# Patient Record
Sex: MALE
[De-identification: names, ages, dates, MRNs, and addresses within clinical notes are randomized per-mention and may not be internally consistent; named-entity substitution may affect disease eponyms.]

---

## 2019-03-15 ENCOUNTER — APPOINTMENT (OUTPATIENT)
Dept: OTOLARYNGOLOGY | Facility: CLINIC | Age: 64
End: 2019-03-15
Payer: MEDICARE

## 2019-03-15 VITALS
BODY MASS INDEX: 27.83 KG/M2 | HEIGHT: 73 IN | HEART RATE: 56 BPM | WEIGHT: 210 LBS | DIASTOLIC BLOOD PRESSURE: 70 MMHG | SYSTOLIC BLOOD PRESSURE: 124 MMHG

## 2019-03-15 DIAGNOSIS — Z80.52 FAMILY HISTORY OF MALIGNANT NEOPLASM OF BLADDER: ICD-10-CM

## 2019-03-15 DIAGNOSIS — Z87.09 PERSONAL HISTORY OF OTHER DISEASES OF THE RESPIRATORY SYSTEM: ICD-10-CM

## 2019-03-15 DIAGNOSIS — Z86.69 PERSONAL HISTORY OF OTHER DISEASES OF THE NERVOUS SYSTEM AND SENSE ORGANS: ICD-10-CM

## 2019-03-15 DIAGNOSIS — J30.0 VASOMOTOR RHINITIS: ICD-10-CM

## 2019-03-15 DIAGNOSIS — Z78.9 OTHER SPECIFIED HEALTH STATUS: ICD-10-CM

## 2019-03-15 DIAGNOSIS — H93.293 OTHER ABNORMAL AUDITORY PERCEPTIONS, BILATERAL: ICD-10-CM

## 2019-03-15 DIAGNOSIS — B20 HUMAN IMMUNODEFICIENCY VIRUS [HIV] DISEASE: ICD-10-CM

## 2019-03-15 DIAGNOSIS — Z82.3 FAMILY HISTORY OF STROKE: ICD-10-CM

## 2019-03-15 DIAGNOSIS — Z83.3 FAMILY HISTORY OF DIABETES MELLITUS: ICD-10-CM

## 2019-03-15 DIAGNOSIS — Z80.7 FAMILY HISTORY OF OTHER MALIGNANT NEOPLASMS OF LYMPHOID, HEMATOPOIETIC AND RELATED TISSUES: ICD-10-CM

## 2019-03-15 PROCEDURE — 99213 OFFICE O/P EST LOW 20 MIN: CPT

## 2019-03-15 RX ORDER — ZOLPIDEM TARTRATE 10 MG/1
10 TABLET ORAL
Qty: 30 | Refills: 0 | Status: ACTIVE | COMMUNITY
Start: 2019-01-18

## 2019-03-15 RX ORDER — ELVITEGRAVIR, COBICISTAT, EMTRICITABINE, AND TENOFOVIR ALAFENAMIDE 150; 150; 200; 10 MG/1; MG/1; MG/1; MG/1
150-150-200-10 TABLET ORAL
Qty: 30 | Refills: 0 | Status: ACTIVE | COMMUNITY
Start: 2019-01-18

## 2019-03-15 NOTE — ASSESSMENT
[FreeTextEntry1] : He has abnormal auditory perception. He thinks he may have worsening of his hearing. His ears were normal on exam\par \par PLAN\par \par -findings and management options discussed in detail with the patient. \par -good aural hygiene\par -avoid using cotton swabs in the ears\par -noise precautions\par -audiogram recommended. he is unable to stay for the test today. He will return for it. \par -allergy and reflux medications prn\par -follow up for the audiogram\par -call and return earlier if any concerns. \par \par

## 2019-04-02 ENCOUNTER — APPOINTMENT (OUTPATIENT)
Dept: OTOLARYNGOLOGY | Facility: CLINIC | Age: 64
End: 2019-04-02
Payer: MEDICARE

## 2019-04-02 PROCEDURE — 92567 TYMPANOMETRY: CPT

## 2019-04-02 PROCEDURE — 99213 OFFICE O/P EST LOW 20 MIN: CPT | Mod: 25

## 2019-04-02 PROCEDURE — G0268 REMOVAL OF IMPACTED WAX MD: CPT

## 2019-04-02 PROCEDURE — 92557 COMPREHENSIVE HEARING TEST: CPT

## 2019-04-02 RX ORDER — ILOPERIDONE 2 MG/1
2 TABLET ORAL
Qty: 30 | Refills: 0 | Status: COMPLETED | COMMUNITY
Start: 2018-10-19

## 2019-04-02 RX ORDER — AMOXICILLIN 500 MG/1
500 CAPSULE ORAL
Qty: 21 | Refills: 0 | Status: COMPLETED | COMMUNITY
Start: 2018-10-16

## 2019-04-02 RX ORDER — CHLORHEXIDINE GLUCONATE, 0.12% ORAL RINSE 1.2 MG/ML
0.12 SOLUTION DENTAL
Qty: 473 | Refills: 0 | Status: COMPLETED | COMMUNITY
Start: 2018-10-16

## 2019-04-02 RX ORDER — TRAZODONE HYDROCHLORIDE 50 MG/1
50 TABLET ORAL
Qty: 90 | Refills: 0 | Status: ACTIVE | COMMUNITY
Start: 2018-10-19

## 2019-04-02 RX ORDER — LATANOPROST/PF 0.005 %
0.01 DROPS OPHTHALMIC (EYE)
Qty: 25 | Refills: 0 | Status: ACTIVE | COMMUNITY
Start: 2018-09-18

## 2019-04-02 NOTE — ASSESSMENT
[FreeTextEntry1] : He has a mild to  moderate bilateral high-frequency sensorineural hearing loss. He had cerumen impaction in the right ear which was removed\par \par PLAN\par \par -findings and management options discussed in detail with the patient. \par -good aural hygiene\par -avoid using cotton swabs in the ears\par -noise precautions\par -annual audiogram\par -follow up 6 months\par -call and return earlier if any concerns. \par

## 2019-04-02 NOTE — HISTORY OF PRESENT ILLNESS
[de-identified] : Chance Sauceda is here for followup for hearing loss. He had the audiogram. We reviewed the results. He does have mild to moderate bilateral high-frequency sensorineural hearing loss. He is otherwise doing well\par \par ENT History\par 3/15/19- He is here today for possible hearing loss. He feels that he has been talking more loudly over the past few months. He is also missing words. He denies tinnitus, dizziness, otalgia, or otorrhea. He has history of chronic rhinitis and reflux. He does not have nasal or throat symptoms today

## 2019-06-28 ENCOUNTER — APPOINTMENT (OUTPATIENT)
Dept: OTOLARYNGOLOGY | Facility: CLINIC | Age: 64
End: 2019-06-28
Payer: MEDICARE

## 2019-06-28 VITALS — SYSTOLIC BLOOD PRESSURE: 122 MMHG | HEART RATE: 58 BPM | DIASTOLIC BLOOD PRESSURE: 78 MMHG

## 2019-06-28 DIAGNOSIS — H61.21 IMPACTED CERUMEN, RIGHT EAR: ICD-10-CM

## 2019-06-28 PROCEDURE — 69210 REMOVE IMPACTED EAR WAX UNI: CPT

## 2019-06-28 PROCEDURE — 99213 OFFICE O/P EST LOW 20 MIN: CPT | Mod: 25

## 2019-06-28 NOTE — HISTORY OF PRESENT ILLNESS
[de-identified] : 4/2/19- Chance Sauceda is here for followup for hearing loss. He had the audiogram. We reviewed the results. He does have mild to moderate bilateral high-frequency sensorineural hearing loss. He is otherwise doing well\par \par ENT History\par 3/15/19- He is here today for possible hearing loss. He feels that he has been talking more loudly over the past few months. He is also missing words. He denies tinnitus, dizziness, otalgia, or otorrhea. He has history of chronic rhinitis and reflux. He does not have nasal or throat symptoms today  [FreeTextEntry1] : \par 6/28/19- DAT HAIRSTON Is here to check his throat. He had a sore throat the other day that was mild. It has resolved. He has no other symptoms. He has a history of reflux. He saw gastroenterologist. He is not currently on medication. He has a history of recurrent cerumen impaction.

## 2019-06-28 NOTE — ASSESSMENT
[FreeTextEntry1] : He had a mild sore throat the other day which has resolved.  He had cerumen impaction which was removed\par \par PLAN\par \par -findings and management options discussed in detail with the patient. \par -good aural hygiene\par -wax removal drops prn\par -annual audiogram\par -hydration\par -reflux precautions and medication as needed. He was told to speak with his GI about how long he should be treated\par -follow up if he has recurrent throat symptoms.we will proceed with further work up. Otherwise, I will see him back in a few months to check his ears and his throat\par -call and return earlier if any concerns. \par

## 2019-07-11 ENCOUNTER — APPOINTMENT (OUTPATIENT)
Dept: OTOLARYNGOLOGY | Facility: CLINIC | Age: 64
End: 2019-07-11
Payer: MEDICARE

## 2019-07-11 DIAGNOSIS — Z87.09 PERSONAL HISTORY OF OTHER DISEASES OF THE RESPIRATORY SYSTEM: ICD-10-CM

## 2019-07-11 PROCEDURE — 99213 OFFICE O/P EST LOW 20 MIN: CPT | Mod: 25

## 2019-07-11 PROCEDURE — 31575 DIAGNOSTIC LARYNGOSCOPY: CPT

## 2019-07-11 RX ORDER — ZALEPLON 10 MG/1
10 CAPSULE ORAL
Qty: 30 | Refills: 0 | Status: ACTIVE | COMMUNITY
Start: 2019-05-23

## 2019-07-11 RX ORDER — AMOXICILLIN 500 MG/1
500 TABLET, FILM COATED ORAL
Qty: 56 | Refills: 0 | Status: COMPLETED | COMMUNITY
Start: 2019-06-21

## 2019-07-11 RX ORDER — TOPIRAMATE 25 MG/1
25 TABLET, FILM COATED ORAL
Qty: 60 | Refills: 0 | Status: ACTIVE | COMMUNITY
Start: 2019-05-21

## 2019-07-11 RX ORDER — HYDROCORTISONE 25 MG/G
2.5 CREAM TOPICAL
Qty: 2835 | Refills: 0 | Status: ACTIVE | COMMUNITY
Start: 2019-03-06

## 2019-07-11 NOTE — ASSESSMENT
[FreeTextEntry1] : He has an acute upper respiratory tract infection. This is likely viral in origin. A culture was sent to rule out bacterial infection\par \par PLAN\par \par -findings and management options discussed in detail with the patient. \par -supportive management for the URI\par -saltwater gargles as needed\par -OTC decongestants and flonase as needed\par -He was asked to call for the culture results on Monday\par -reflux precautions and medication for reflux as needed. \par -follow up if symptoms do not resolve over the next couple of weeks.  
Gastric reflux

## 2019-07-11 NOTE — HISTORY OF PRESENT ILLNESS
[de-identified] : 4/2/19- Chance Sauceda is here for followup for hearing loss. He had the audiogram. We reviewed the results. He does have mild to moderate bilateral high-frequency sensorineural hearing loss. He is otherwise doing well\par \par ENT History\par 3/15/19- He is here today for possible hearing loss. He feels that he has been talking more loudly over the past few months. He is also missing words. He denies tinnitus, dizziness, otalgia, or otorrhea. He has history of chronic rhinitis and reflux. He does not have nasal or throat symptoms today  [FreeTextEntry1] : \par 6/28/19- DAT SILVA Is here to check his throat. He had a sore throat the other day that was mild. It has resolved. He has no other symptoms. He has a history of reflux. He saw gastroenterologist. He is not currently on medication. He has a history of recurrent cerumen impaction.\par \par 7/11/19- Dat Silva is here with a 2-3 day history of sore throat, nasal congestion and cough. He is concerned he may have an infection.

## 2019-07-15 LAB — BACTERIA THROAT CULT: NORMAL

## 2019-09-20 ENCOUNTER — APPOINTMENT (OUTPATIENT)
Dept: OTOLARYNGOLOGY | Facility: CLINIC | Age: 64
End: 2019-09-20
Payer: MEDICARE

## 2019-09-20 DIAGNOSIS — H69.81 OTHER SPECIFIED DISORDERS OF EUSTACHIAN TUBE, RIGHT EAR: ICD-10-CM

## 2019-09-20 PROCEDURE — 92567 TYMPANOMETRY: CPT

## 2019-09-20 PROCEDURE — 92557 COMPREHENSIVE HEARING TEST: CPT

## 2019-09-20 PROCEDURE — 99213 OFFICE O/P EST LOW 20 MIN: CPT | Mod: 25

## 2019-09-20 PROCEDURE — G0268 REMOVAL OF IMPACTED WAX MD: CPT

## 2019-09-20 NOTE — ASSESSMENT
[FreeTextEntry1] : He has right abnormal auditory perception and sensation of eustachian tube dysfunction. He had cerumen bilaterally which was removed.  There was no middle ear effusion or infection. Audiogram showed no change in his hearing.\par \par PLAN\par \par -findings and management options discussed in detail with the patient. \par -good aural hygiene\par -avoid using cotton swabs in the ears\par -annual audiogram\par -trial of flonase and/or a decongestant\par -follow up if symptoms do not resolve over the next couple of weeks.

## 2019-09-20 NOTE — HISTORY OF PRESENT ILLNESS
[FreeTextEntry1] : \par 6/28/19- DAT SILVA Is here to check his throat. He had a sore throat the other day that was mild. It has resolved. He has no other symptoms. He has a history of reflux. He saw gastroenterologist. He is not currently on medication. He has a history of recurrent cerumen impaction.\par \par 7/11/19- Dat Silva is here with a 2-3 day history of sore throat, nasal congestion and cough. He is concerned he may have an infection.\par \par 9/20/19- DAT SILVA is Here for several day history of right ear pressure. He did have upper respiratory tract infection shortly before that. He has no otalgia, otorrhea, tinnitus, or dizziness. He does not think his hearing has changed. The URI has resolved.  [de-identified] : 4/2/19- Chance Sauceda is here for followup for hearing loss. He had the audiogram. We reviewed the results. He does have mild to moderate bilateral high-frequency sensorineural hearing loss. He is otherwise doing well\par \par ENT History\par 3/15/19- He is here today for possible hearing loss. He feels that he has been talking more loudly over the past few months. He is also missing words. He denies tinnitus, dizziness, otalgia, or otorrhea. He has history of chronic rhinitis and reflux. He does not have nasal or throat symptoms today

## 2020-01-17 ENCOUNTER — APPOINTMENT (OUTPATIENT)
Dept: OTOLARYNGOLOGY | Facility: CLINIC | Age: 65
End: 2020-01-17
Payer: MEDICARE

## 2020-01-17 PROCEDURE — 69210 REMOVE IMPACTED EAR WAX UNI: CPT

## 2020-01-17 PROCEDURE — 99213 OFFICE O/P EST LOW 20 MIN: CPT | Mod: 25

## 2020-01-17 PROCEDURE — 31575 DIAGNOSTIC LARYNGOSCOPY: CPT

## 2020-01-17 NOTE — HISTORY OF PRESENT ILLNESS
[de-identified] : \par 3/15/19- He is here today for possible hearing loss. He feels that he has been talking more loudly over the past few months. He is also missing words. He denies tinnitus, dizziness, otalgia, or otorrhea. He has history of chronic rhinitis and reflux. He does not have nasal or throat symptoms today \par \par 4/2/19- Chance Sauceda is here for followup for hearing loss. He had the audiogram. We reviewed the results. He does have mild to moderate bilateral high-frequency sensorineural hearing loss. He is otherwise doing well\par  [FreeTextEntry1] : \par 6/28/19- DAT SILVA Is here to check his throat. He had a sore throat the other day that was mild. It has resolved. He has no other symptoms. He has a history of reflux. He saw gastroenterologist. He is not currently on medication. He has a history of recurrent cerumen impaction.\par \par 7/11/19- Dat Silva is here with a 2-3 day history of sore throat, nasal congestion and cough. He is concerned he may have an infection.\par \par 9/20/19- DAT SILVA is Here for several day history of right ear pressure. He did have upper respiratory tract infection shortly before that. He has no otalgia, otorrhea, tinnitus, or dizziness. He does not think his hearing has changed. The URI has resolved. \par \par 1/17/20- Dat Is here to check his ears for cerumen impaction. he has fullness in the ears. For the past several days, he's had a mild upper respiratory infection with mild cough, sore throat, and nasal congestion. He does not have any fevers. He is not taking any cold medications. He is following reflux precautions. He takes medication as needed. He had cataract surgery on his right eye since his last visit.

## 2020-01-17 NOTE — ASSESSMENT
[FreeTextEntry1] : Cerumen impaction was removed from both ears. There is no infection. He does have an acute upper respiratory tract infection. Nasal endoscopy showed dryness anteriorly but was otherwise unremarkable.He likely has a viral infection. \par \par PLAN\par \par -findings and management options discussed in detail with the patient. \par -good aural hygiene\par -avoid using cotton swabs in the ears\par -annual audiogram\par -moisturizing nasal gel as needed for dryness. \par -supportive management for the URI. OTC flonase and decongestants as needed\par -continue management of reflux\par -follow up if symptoms do not resolve

## 2020-01-31 ENCOUNTER — APPOINTMENT (OUTPATIENT)
Dept: OTOLARYNGOLOGY | Facility: CLINIC | Age: 65
End: 2020-01-31
Payer: MEDICARE

## 2020-01-31 DIAGNOSIS — J06.9 ACUTE UPPER RESPIRATORY INFECTION, UNSPECIFIED: ICD-10-CM

## 2020-01-31 PROCEDURE — 99213 OFFICE O/P EST LOW 20 MIN: CPT | Mod: 25

## 2020-01-31 PROCEDURE — 31231 NASAL ENDOSCOPY DX: CPT

## 2020-01-31 NOTE — HISTORY OF PRESENT ILLNESS
[de-identified] : 3/15/19- He is here today for possible hearing loss. He feels that he has been talking more loudly over the past few months. He is also missing words. He denies tinnitus, dizziness, otalgia, or otorrhea. He has history of chronic rhinitis and reflux. He does not have nasal or throat symptoms today \par \par 4/2/19- Chance Sauceda is here for followup for hearing loss. He had the audiogram. We reviewed the results. He does have mild to moderate bilateral high-frequency sensorineural hearing loss. He is otherwise doing well\par \par  [FreeTextEntry1] : \par 6/28/19- DAT SILVA Is here to check his throat. He had a sore throat the other day that was mild. It has resolved. He has no other symptoms. He has a history of reflux. He saw gastroenterologist. He is not currently on medication. He has a history of recurrent cerumen impaction.\par \par 7/11/19- Dat Silva is here with a 2-3 day history of sore throat, nasal congestion and cough. He is concerned he may have an infection.\par \par 9/20/19- DAT SILVA is Here for several day history of right ear pressure. He did have upper respiratory tract infection shortly before that. He has no otalgia, otorrhea, tinnitus, or dizziness. He does not think his hearing has changed. The URI has resolved. \par \par 1/17/20- Dat Is here to check his ears for cerumen impaction. he has fullness in the ears. For the past several days, he's had a mild upper respiratory infection with mild cough, sore throat, and nasal congestion. He does not have any fevers. He is not taking any cold medications. He is following reflux precautions. He takes medication as needed. He had cataract surgery on his right eye since his last visit. \par \par 1/31/20- He is here for a cough with occasional yellow sputum, nasal congestion, and mild sore throat. He did have an upper respiratory tract infection when I saw him 2 weeks ago. He has not had any fevers. \par

## 2020-01-31 NOTE — ASSESSMENT
[FreeTextEntry1] : He has a persistent cough and nasal congestion following an upper respiratory tract infection. There was no purulent drainage an exam but I did culture the stringy mucus.\par \par PLAN\par \par -findings and management options discussed in detail with the patient. \par -Nasal saline irrigations, nasal steroid spray and decongestant as needed\par -I asked him to call Monday for the culture results. I gave him Augmentin to take if the culture is positive or if his symptoms worsen over the weekend. \par -I recommended that he see his PCP or go to urgent care for pulmonary evaluation since he is having a persistent cough. He should be evaluated to rule out bronchitis or other pulmonary infection\par -Continue treatment for reflux\par -We will see how he is feeling when he calls on Monday for the results\par -follow up if symptoms do not resolve

## 2020-02-06 LAB — BACTERIA FLD CULT: ABNORMAL

## 2020-02-20 ENCOUNTER — APPOINTMENT (OUTPATIENT)
Dept: OTOLARYNGOLOGY | Facility: CLINIC | Age: 65
End: 2020-02-20

## 2020-10-09 ENCOUNTER — APPOINTMENT (OUTPATIENT)
Dept: OTOLARYNGOLOGY | Facility: CLINIC | Age: 65
End: 2020-10-09
Payer: MEDICARE

## 2020-10-09 VITALS — HEIGHT: 73 IN | WEIGHT: 210 LBS | TEMPERATURE: 98.1 F | BODY MASS INDEX: 27.83 KG/M2

## 2020-10-09 PROCEDURE — 69210 REMOVE IMPACTED EAR WAX UNI: CPT

## 2020-10-09 PROCEDURE — 99213 OFFICE O/P EST LOW 20 MIN: CPT | Mod: 25

## 2020-10-09 RX ORDER — AMOXICILLIN AND CLAVULANATE POTASSIUM 875; 125 MG/1; MG/1
875-125 TABLET, COATED ORAL
Qty: 20 | Refills: 1 | Status: COMPLETED | COMMUNITY
Start: 2020-01-31 | End: 2020-10-09

## 2020-10-09 RX ORDER — AMOXICILLIN AND CLAVULANATE POTASSIUM 875; 125 MG/1; MG/1
875-125 TABLET, COATED ORAL
Qty: 14 | Refills: 0 | Status: COMPLETED | COMMUNITY
Start: 2020-02-11 | End: 2020-10-09

## 2020-10-09 NOTE — HISTORY OF PRESENT ILLNESS
[de-identified] : DAT HAIRSTON is a 65 year patient With a history of recurrent cerumen impaction, hearing loss, chronic rhinitis, and reflux. He was last seen in January. He had an infection. His culture was positive and he was treated with antibiotics. He is here today to have his ears cleaned. He has no otalgia or otorrhea. He has no nasal or throat symptoms. He said that he did test positive for COVID antibodies

## 2020-10-09 NOTE — ASSESSMENT
[FreeTextEntry1] : Cerumen impaction was removed bilaterally. He felt better afterwards\par \par PLAN\par \par -findings and management options discussed in detail with the patient. \par -good aural hygiene\par -avoid using cotton swabs in the ears\par -wax removal drops as needed. \par -noise precautions\par -annual audiogram- he said he will have one at his next visit. \par -follow up in a few months to check his ears. \par -call and return earlier if any concerns. \par

## 2020-12-21 PROBLEM — Z87.09 HISTORY OF SORE THROAT: Status: RESOLVED | Noted: 2019-06-28 | Resolved: 2020-12-21

## 2020-12-23 PROBLEM — J06.9 ACUTE URI: Status: RESOLVED | Noted: 2020-01-17 | Resolved: 2020-12-23

## 2021-01-08 ENCOUNTER — APPOINTMENT (OUTPATIENT)
Dept: OTOLARYNGOLOGY | Facility: CLINIC | Age: 66
End: 2021-01-08

## 2021-02-02 ENCOUNTER — APPOINTMENT (OUTPATIENT)
Dept: OPHTHALMOLOGY | Facility: CLINIC | Age: 66
End: 2021-02-02

## 2021-03-12 ENCOUNTER — APPOINTMENT (OUTPATIENT)
Dept: OPHTHALMOLOGY | Facility: CLINIC | Age: 66
End: 2021-03-12

## 2021-06-24 ENCOUNTER — APPOINTMENT (OUTPATIENT)
Dept: OPHTHALMOLOGY | Facility: CLINIC | Age: 66
End: 2021-06-24

## 2021-07-29 ENCOUNTER — APPOINTMENT (OUTPATIENT)
Dept: OTOLARYNGOLOGY | Facility: CLINIC | Age: 66
End: 2021-07-29
Payer: MEDICARE

## 2021-07-29 VITALS — BODY MASS INDEX: 27.83 KG/M2 | HEIGHT: 73 IN | WEIGHT: 210 LBS | TEMPERATURE: 97.1 F

## 2021-07-29 PROCEDURE — 31231 NASAL ENDOSCOPY DX: CPT

## 2021-07-29 PROCEDURE — 69210 REMOVE IMPACTED EAR WAX UNI: CPT

## 2021-07-29 PROCEDURE — 99213 OFFICE O/P EST LOW 20 MIN: CPT | Mod: 25

## 2021-07-29 RX ORDER — EMPAGLIFLOZIN 10 MG/1
10 TABLET, FILM COATED ORAL
Qty: 90 | Refills: 0 | Status: ACTIVE | COMMUNITY
Start: 2021-04-15

## 2021-07-29 RX ORDER — SODIUM SULFATE, POTASSIUM SULFATE, MAGNESIUM SULFATE 17.5; 3.13; 1.6 G/ML; G/ML; G/ML
17.5-3.13-1.6 SOLUTION, CONCENTRATE ORAL
Qty: 354 | Refills: 0 | Status: COMPLETED | COMMUNITY
Start: 2019-03-17 | End: 2021-07-29

## 2021-07-29 RX ORDER — FLUTICASONE PROPIONATE 50 UG/1
50 SPRAY, METERED NASAL DAILY
Qty: 1 | Refills: 3 | Status: COMPLETED | COMMUNITY
Start: 2019-07-11 | End: 2021-07-29

## 2021-07-29 RX ORDER — DICLOFENAC SODIUM 10 MG/G
1 GEL TOPICAL
Qty: 100 | Refills: 0 | Status: COMPLETED | COMMUNITY
Start: 2019-01-15 | End: 2021-07-29

## 2021-07-29 RX ORDER — ALBUTEROL SULFATE 90 UG/1
108 (90 BASE) INHALANT RESPIRATORY (INHALATION)
Qty: 8 | Refills: 0 | Status: ACTIVE | COMMUNITY
Start: 2021-07-20

## 2021-07-29 RX ORDER — BUPROPION HYDROCHLORIDE 200 MG/1
200 TABLET, FILM COATED, EXTENDED RELEASE ORAL
Qty: 30 | Refills: 0 | Status: COMPLETED | COMMUNITY
Start: 2018-10-19 | End: 2021-07-29

## 2021-07-29 RX ORDER — AZITHROMYCIN 250 MG/1
250 TABLET, FILM COATED ORAL
Qty: 6 | Refills: 0 | Status: COMPLETED | COMMUNITY
Start: 2021-07-20

## 2021-07-29 RX ORDER — TRAZODONE HYDROCHLORIDE 100 MG/1
100 TABLET ORAL
Qty: 180 | Refills: 0 | Status: COMPLETED | COMMUNITY
Start: 2021-05-07

## 2021-07-29 RX ORDER — BRIMONIDINE TARTRATE 1 MG/ML
0.1 SOLUTION/ DROPS OPHTHALMIC
Qty: 5 | Refills: 0 | Status: COMPLETED | COMMUNITY
Start: 2021-03-25

## 2021-07-29 RX ORDER — KETOCONAZOLE 20 MG/G
2 CREAM TOPICAL
Qty: 60 | Refills: 0 | Status: ACTIVE | COMMUNITY
Start: 2021-03-17

## 2021-07-29 RX ORDER — ROSUVASTATIN CALCIUM 20 MG/1
20 TABLET, FILM COATED ORAL
Qty: 90 | Refills: 0 | Status: ACTIVE | COMMUNITY
Start: 2021-07-08

## 2021-07-29 RX ORDER — BLOOD SUGAR DIAGNOSTIC
STRIP MISCELLANEOUS
Qty: 100 | Refills: 0 | Status: ACTIVE | COMMUNITY
Start: 2021-03-12

## 2021-07-29 RX ORDER — GLIPIZIDE 5 MG/1
5 TABLET, FILM COATED, EXTENDED RELEASE ORAL
Qty: 90 | Refills: 0 | Status: ACTIVE | COMMUNITY
Start: 2021-06-15

## 2021-07-29 RX ORDER — FLUTICASONE PROPIONATE 50 UG/1
50 SPRAY, METERED NASAL DAILY
Qty: 1 | Refills: 3 | Status: ACTIVE | COMMUNITY
Start: 2021-07-29 | End: 1900-01-01

## 2021-07-29 RX ORDER — DORZOLAMIDE HYDROCHLORIDE 20 MG/ML
2 SOLUTION OPHTHALMIC
Qty: 10 | Refills: 0 | Status: ACTIVE | COMMUNITY
Start: 2021-02-12

## 2021-07-29 RX ORDER — BRIMONIDINE TARTRATE 2 MG/MG
0.2 SOLUTION/ DROPS OPHTHALMIC
Qty: 15 | Refills: 0 | Status: ACTIVE | COMMUNITY
Start: 2021-05-06

## 2021-07-29 RX ORDER — PANTOPRAZOLE 40 MG/1
40 TABLET, DELAYED RELEASE ORAL
Qty: 28 | Refills: 0 | Status: COMPLETED | COMMUNITY
Start: 2019-06-21 | End: 2021-07-29

## 2021-07-29 RX ORDER — HYDROCORTISONE 1 %
12 CREAM (GRAM) TOPICAL
Qty: 225 | Refills: 0 | Status: COMPLETED | COMMUNITY
Start: 2021-03-17

## 2021-07-29 RX ORDER — PREDNISOLONE ACETATE 10 MG/ML
1 SUSPENSION/ DROPS OPHTHALMIC
Qty: 5 | Refills: 0 | Status: COMPLETED | COMMUNITY
Start: 2021-07-08

## 2021-07-29 RX ORDER — CICLOPIROX 7.7 MG/G
0.77 GEL TOPICAL
Qty: 30 | Refills: 0 | Status: COMPLETED | COMMUNITY
Start: 2019-02-08 | End: 2021-07-29

## 2021-07-29 RX ORDER — BUPROPION HYDROCHLORIDE 100 MG/1
100 TABLET, FILM COATED, EXTENDED RELEASE ORAL
Qty: 30 | Refills: 0 | Status: COMPLETED | COMMUNITY
Start: 2018-09-21 | End: 2021-07-29

## 2021-07-29 RX ORDER — METFORMIN HYDROCHLORIDE 500 MG/1
500 TABLET, COATED ORAL
Qty: 180 | Refills: 0 | Status: ACTIVE | COMMUNITY
Start: 2021-03-05

## 2021-07-29 RX ORDER — VALSARTAN 160 MG/1
160 TABLET, COATED ORAL
Qty: 90 | Refills: 0 | Status: ACTIVE | COMMUNITY
Start: 2021-07-12

## 2021-07-29 RX ORDER — OFLOXACIN 3 MG/ML
0.3 SOLUTION/ DROPS OPHTHALMIC
Qty: 5 | Refills: 0 | Status: COMPLETED | COMMUNITY
Start: 2021-07-08

## 2021-07-29 RX ORDER — OXYCODONE AND ACETAMINOPHEN 5; 325 MG/1; MG/1
5-325 TABLET ORAL
Qty: 20 | Refills: 0 | Status: COMPLETED | COMMUNITY
Start: 2021-05-18

## 2021-07-29 NOTE — ASSESSMENT
[FreeTextEntry1] : Cerumen impaction was removed from both ears.\par \par He had a recent upper respiratory tract infection. There was a little stringy clear mucous on nasal endoscopy which was cultured.\par \par He has a history of reflux.\par \par PLAN\par \par -findings and management options discussed in detail with the patient. \par -Nasal saline irrigations, nasal steroid spray and decongestant as needed\par -I asked him to call Monday for the culture results. \par -management of reflux recommended\par -good aural hygiene. \par -audiogram at his next visit. \par -follow up if symptoms do not resolve. If he is doing well, I will see him in 6 months\par -Call and return urgently if any worsening symptoms or concerns

## 2021-07-29 NOTE — HISTORY OF PRESENT ILLNESS
[de-identified] : DAT HAIRSTON is a 66 year patient Here for recurrent cerumen impaction. He has a history of hearing loss, chronic rhinitis, and reflux. He has been doing well overall since I last saw him. He did have a mild upper respiratory tract infection about 10 days ago. He has no sinus pain or pressure but does have nasal drainage. He has been vaccinated for COVID.  He had a COVID test which was negative. He had tested positive antibodies for COVID last year. He will also be undergoing another surgery for cataracts.

## 2021-08-04 LAB — BACTERIA FLD CULT: ABNORMAL

## 2021-12-10 ENCOUNTER — APPOINTMENT (OUTPATIENT)
Dept: OTOLARYNGOLOGY | Facility: CLINIC | Age: 66
End: 2021-12-10
Payer: MEDICARE

## 2021-12-10 PROCEDURE — 69210 REMOVE IMPACTED EAR WAX UNI: CPT

## 2021-12-10 PROCEDURE — 99212 OFFICE O/P EST SF 10 MIN: CPT | Mod: 25

## 2021-12-10 NOTE — HISTORY OF PRESENT ILLNESS
[de-identified] : DAT HAIRSTON is a 66 year patient Here for ear pressure and discomfort. He thinks he may have wax buildup. He has no nasal or throat symptoms. He is still having problems with his eye following cataract surgery\par \par ENT history\par He has a history of hearing loss, chronic rhinitis, and reflux

## 2021-12-10 NOTE — ASSESSMENT
[FreeTextEntry1] : He had cerumen impaction which was removed. He felt better afterwards. He has a history of a sensorineural hearing loss\par \par PLAN\par \par -findings and management options discussed in detail with the patient. \par -good aural hygiene. \par -he deferred an audiogram today\par -follow up in 6 months if doing well. \par -Call and return urgently if any worsening symptoms or concerns

## 2022-02-23 ENCOUNTER — APPOINTMENT (OUTPATIENT)
Dept: OPHTHALMOLOGY | Facility: CLINIC | Age: 67
End: 2022-02-23
Payer: MEDICARE

## 2022-02-23 ENCOUNTER — NON-APPOINTMENT (OUTPATIENT)
Age: 67
End: 2022-02-23

## 2022-02-23 PROCEDURE — 92250 FUNDUS PHOTOGRAPHY W/I&R: CPT

## 2022-02-23 PROCEDURE — 92002 INTRM OPH EXAM NEW PATIENT: CPT

## 2022-03-23 ENCOUNTER — NON-APPOINTMENT (OUTPATIENT)
Age: 67
End: 2022-03-23

## 2022-03-23 ENCOUNTER — APPOINTMENT (OUTPATIENT)
Dept: OPHTHALMOLOGY | Facility: CLINIC | Age: 67
End: 2022-03-23
Payer: MEDICARE

## 2022-03-23 PROCEDURE — 92012 INTRM OPH EXAM EST PATIENT: CPT

## 2022-04-21 ENCOUNTER — APPOINTMENT (OUTPATIENT)
Dept: OTOLARYNGOLOGY | Facility: CLINIC | Age: 67
End: 2022-04-21

## 2022-05-03 ENCOUNTER — APPOINTMENT (OUTPATIENT)
Dept: OTOLARYNGOLOGY | Facility: CLINIC | Age: 67
End: 2022-05-03
Payer: MEDICARE

## 2022-05-03 DIAGNOSIS — R49.0 DYSPHONIA: ICD-10-CM

## 2022-05-03 PROCEDURE — 99213 OFFICE O/P EST LOW 20 MIN: CPT | Mod: 25

## 2022-05-03 PROCEDURE — 31575 DIAGNOSTIC LARYNGOSCOPY: CPT

## 2022-05-03 NOTE — HISTORY OF PRESENT ILLNESS
[de-identified] : DAT HAIRSTON is a 67 year old patient who is well-known to me.  He has a history of hearing loss, chronic rhinitis, and reflux.  He said that he had the flu a few weeks ago.  He said COVID testing was negative.  He was treated with a Z-Bill.  He was then placed on Levaquin and prednisone for pulmonary symptoms.  He is feeling better but does have mild hoarseness and cough.  He does have reflux but is not currently on medication.  He also saw another ENT physician.  He would like to check his ear to make sure the wax has been removed

## 2022-05-03 NOTE — ASSESSMENT
[FreeTextEntry1] : He has had mild hoarseness since he had an upper respiratory tract infection and bronchitis.  On exam, he has mild erythema of the posterior right vocal cord.  There were no suspicious masses or lesions.  He does have a history of reflux and likely has exacerbation of that as well.\par \par His ears were normal on exam.\par \par PLAN\par \par -findings and management options discussed in detail with the patient. \par -Hydration and medication\par -Good vocal hygiene reviewed and recommended\par -Reflux precautions and Pepcid for 2 weeks\par -Since he just finished the antibiotics, we will hold off on repeating the culture\par -Nasal saline irrigations, nasal steroid spray and decongestant as needed\par -good aural hygiene. \par -He declined an audiogram today.  He said he will return for one in a few weeks.  I will also reevaluate the larynx when he does\par -Call and return urgently if any worsening symptoms or concerns

## 2022-05-24 ENCOUNTER — APPOINTMENT (OUTPATIENT)
Dept: OPHTHALMOLOGY | Facility: CLINIC | Age: 67
End: 2022-05-24

## 2022-05-27 ENCOUNTER — APPOINTMENT (OUTPATIENT)
Dept: OTOLARYNGOLOGY | Facility: CLINIC | Age: 67
End: 2022-05-27

## 2023-07-25 ENCOUNTER — APPOINTMENT (OUTPATIENT)
Dept: OTOLARYNGOLOGY | Facility: CLINIC | Age: 68
End: 2023-07-25

## 2023-07-28 ENCOUNTER — APPOINTMENT (OUTPATIENT)
Dept: OTOLARYNGOLOGY | Facility: CLINIC | Age: 68
End: 2023-07-28
Payer: MEDICARE

## 2023-07-28 VITALS — WEIGHT: 207 LBS | HEIGHT: 73 IN | BODY MASS INDEX: 27.43 KG/M2

## 2023-07-28 PROCEDURE — 99213 OFFICE O/P EST LOW 20 MIN: CPT | Mod: 25

## 2023-07-28 PROCEDURE — 69210 REMOVE IMPACTED EAR WAX UNI: CPT

## 2023-07-28 PROCEDURE — 31575 DIAGNOSTIC LARYNGOSCOPY: CPT

## 2023-07-28 NOTE — HISTORY OF PRESENT ILLNESS
[de-identified] : DAT HAIRSTON is a 68 year old patient here for cerumen impaction.  He has had more difficulty hearing with background noise.  He has no otalgia or otorrhea.  He does have a fullness in the ear.  He also suffers from chronic rhinitis and reflux.  The reflux has been not bothering him much.

## 2023-07-28 NOTE — ASSESSMENT
[FreeTextEntry1] : He had cerumen impaction bilaterally which was removed.  He has a history hearing loss.\par \par He has a history of chronic rhinitis and reflux.  Nasal endoscopy and flexible laryngoscopy showed a mildly deviated nasal septum and reflux related laryngeal changes\par \par PLAN\par \par -findings and management options discussed in detail with the patient. \par -Continue reflux precautions and medication for reflux as needed.  He says he has an appointment for an upper endoscopy in the near future\par -Allergy and sinus medications as needed\par -Good ear hygiene\par -I recommended repeat audiogram.  He declined today but said he will schedule one\par -I will see him back in 1 year if he is doing well.  I have asked him to call me if he goes for the hearing test\par -Call and return urgently if any worsening symptoms or concerns

## 2023-09-08 ENCOUNTER — APPOINTMENT (OUTPATIENT)
Dept: OTOLARYNGOLOGY | Facility: CLINIC | Age: 68
End: 2023-09-08
Payer: MEDICARE

## 2023-09-08 PROCEDURE — 92567 TYMPANOMETRY: CPT

## 2023-09-08 PROCEDURE — 99213 OFFICE O/P EST LOW 20 MIN: CPT

## 2023-09-08 PROCEDURE — 92557 COMPREHENSIVE HEARING TEST: CPT

## 2023-09-08 NOTE — HISTORY OF PRESENT ILLNESS
[de-identified] : DAT HAIRSTON is a 68 year old patient With a history of bilateral sensorineural hearing loss and recurrent cerumen impaction here to check his hearing.  He continues to have difficulty hearing with background noise.  Audiogram showed a stable mild to moderate bilateral sensorineural hearing loss.

## 2023-09-08 NOTE — ASSESSMENT
[FreeTextEntry1] : He has bilateral SNHL. we reviewed his audiogram  PLAN  -findings and management options discussed in detail with the patient.  -good ear hygiene -monitor hearing -consider HAE -follow up in 1 year or earlier if needed

## 2024-02-22 ENCOUNTER — APPOINTMENT (OUTPATIENT)
Dept: OTOLARYNGOLOGY | Facility: CLINIC | Age: 69
End: 2024-02-22
Payer: MEDICARE

## 2024-02-22 DIAGNOSIS — H61.23 IMPACTED CERUMEN, BILATERAL: ICD-10-CM

## 2024-02-22 DIAGNOSIS — H90.3 SENSORINEURAL HEARING LOSS, BILATERAL: ICD-10-CM

## 2024-02-22 PROCEDURE — 99213 OFFICE O/P EST LOW 20 MIN: CPT | Mod: 25

## 2024-02-22 PROCEDURE — 69210 REMOVE IMPACTED EAR WAX UNI: CPT

## 2024-02-22 PROCEDURE — 31575 DIAGNOSTIC LARYNGOSCOPY: CPT

## 2024-02-22 NOTE — HISTORY OF PRESENT ILLNESS
[de-identified] : DAT HAIRSTON is a 68 year old patient Here for follow-up for bilateral cerumen impaction, sensorineural hearing loss, and reflux.  He has been doing well.  He had a recent upper respiratory tract infection but it has resolved.  He said his reflux has been okay.  He is not currently on medication.  He has altered his diet.

## 2024-02-22 NOTE — PHYSICAL EXAM
[TextEntry] : PHYSICAL EXAM  General: The patient was alert, oriented and in no distress. Voice was clear.  Face: The patient had no facial asymmetry or mass. The skin was unremarkable.  Ears: Hearing normal to conversational voice External ears were normal without deformity. Ear canals- cerumen impaction  PROCEDURE- EAR MICROSCOPY AND CERUMEN REMOVAL  Indication: cerumen removal  Under the microscope, obstructing cerumen was removed atraumatically from the both ears with a suction, curette and/or forceps.  Canals: Dry skin no inflammation.  Tympanic membranes: Intact. no perforation or effusion.  Nose:  The external nose had no significant deformity.     On anterior rhinoscopy, the nasal mucosa was dry.   The anterior septum was grossly midline. There were no visualized polyps, purulence  or masses.   Oral cavity: Oral mucosa- normal. Oral and base of tongue- clear and without mass. Gingival and buccal mucosa- moist and without lesions. Palate- the palate moved well. There was no cleft palate. There appeared to be good salivary flow.   Oral cavity/oropharynx- no pus, erythema or mass  Neck:  The neck was symmetrical. The parotid and submandibular glands were normal without masses. The trachea was midline and there was no unusual crepitus. Thyroid was smooth and nontender and no masses were palpated. No masses  Lymphatics: Cervical adenopathy- none.    PROCEDURE:  FLEXIBLE LARYNGOSCOPY, NASAL ENDOSCOPY   Surgeon: Dr. Wise Indication: Reflux, inadequate/inability to tolerate transoral exam Anesthetic: Topical lidocaine and Afrin Procedure: The patient was placed in a sitting position.  Following application of the topical anesthetic and decongestant, exam was performed with a flexible telescope.  The scope was passed along the right nasal floor to the nasopharynx.  It was then passed into the region of the middle meatus, middle turbinate, and sphenoethmoid region.  An identical procedure was performed on the left side.  The following findings were noted:  Nasal mucosa: Mild edema Septum: Deviated Right nasal cavity      Inferior turbinate: Hypertrophic      Middle turbinate: normal      Superior turbinate: normal      Inferior meatus: no pus, polyps or congestion      Middle meatus:  no pus, polyps or congestion       Superior meatus:  no pus, polyps, or congestion      Sphenoethmoidal recess: no pus, polyps or congestion  Left nasal cavity      Inferior turbinate: Hypertrophic      Middle turbinate: normal      Superior turbinate: normal      Inferior meatus: no pus, polyps or congestion      Middle meatus: no pus, polyps, or congestion      Superior meatus:  no pus, polyps, or congestion      Sphenoethmoidal recess: no pus, polyps or congestion   Nasopharynx: no masses, choanae patent, no adenoid tissue  Base of tongue and vallecula: no masses or asymmetry Posterior pharyngeal wall: no masses.  Hypopharynx: symmetrical. No masses Pyriform sinuses: no lesions or pooling of secretions Epiglottis: normal. No edema or lesions Aryepiglottic folds: normal. No lesions.  True vocal cords: clear and mobile. No lesions. Airway patent False vocal cords: normal Ventricles: no masses.  Arytenoids: Normal Interarytenoid area: no masses.  Mild to moderate edema Subglottis: normal. no masses

## 2024-02-22 NOTE — ASSESSMENT
[FreeTextEntry1] : Cerumen impaction was removed.  He has a history of reflux.  His laryngeal exam is stable.  There were no lesions.  Plan -Findings and management options were discussed with the patient. -Continue good ear hygiene -Monitor hearing -He may consider hearing aid evaluation -Continue reflux precautions and medication as needed -Moisturizing nasal gel as needed for dryness -Follow-up in 6 months to check his ears. -Call and return earlier if any problems

## 2024-05-14 ENCOUNTER — APPOINTMENT (OUTPATIENT)
Dept: OTOLARYNGOLOGY | Facility: CLINIC | Age: 69
End: 2024-05-14
Payer: MEDICARE

## 2024-05-14 DIAGNOSIS — R05.9 COUGH, UNSPECIFIED: ICD-10-CM

## 2024-05-14 DIAGNOSIS — J31.0 CHRONIC RHINITIS: ICD-10-CM

## 2024-05-14 DIAGNOSIS — K21.9 GASTRO-ESOPHAGEAL REFLUX DISEASE W/OUT ESOPHAGITIS: ICD-10-CM

## 2024-05-14 DIAGNOSIS — J02.9 ACUTE PHARYNGITIS, UNSPECIFIED: ICD-10-CM

## 2024-05-14 PROCEDURE — 99213 OFFICE O/P EST LOW 20 MIN: CPT | Mod: 25

## 2024-05-14 PROCEDURE — 31575 DIAGNOSTIC LARYNGOSCOPY: CPT

## 2024-05-14 RX ORDER — FAMOTIDINE 20 MG/1
20 TABLET, FILM COATED ORAL
Qty: 60 | Refills: 3 | Status: ACTIVE | COMMUNITY
Start: 2024-05-14 | End: 1900-01-01

## 2024-05-14 NOTE — HISTORY OF PRESENT ILLNESS
[de-identified] : DAT HAIRSTON is a 69 year old patient with a history of reflux and bilateral sensorineural hearing loss here for a cough and throat symptoms.  His symptoms started a few weeks ago when he had an upper respiratory tract infection.  He saw a local doctor and he said his lungs were clear.  He had also seen a pulmonologist earlier this year for evaluation.  He said allergy testing was positive.  He is undergoing evaluation for sleep apnea.  He has mild throat discomfort, postnasal drip, and nasal congestion.  He has no dysphagia or sinus pain or pressure.  He has reflux but is not currently on medication.  He treated himself with 2 days of amoxicillin.

## 2024-05-14 NOTE — ASSESSMENT
[FreeTextEntry1] : He has had a lingering cough, nasal congestion, postnasal drip and intermittent sore throat following an upper respiratory tract infection.  His symptoms may be due to the resolving infection.  We also discussed possibility of bacterial infection, reflux exacerbation, and allergies.  Throat culture was sent.  Flex laryngoscopy showed laryngeal changes consistent with reflux but was otherwise unremarkable  Plan -Findings and management options were discussed with the patient. - Salt water gargles as needed - Hydration - Reflux precautions and Pepcid - Nasal steroid spray-he was told to check with his PCP to make sure it is okay to use a nasal steroid spray.  There may be an interaction with when his medications - he was asked to call for the culture results.  If it is positive, I will place manage biotics. - I recommended that he speak with his pulmonologist about the cough - I will see how he is feeling when he calls for the culture results.  He will return if his symptoms do not improve or if they worsen.  If the symptoms resolve, I will see him back in a few months

## 2024-05-14 NOTE — PHYSICAL EXAM
[TextEntry] : PHYSICAL EXAM  General: The patient was alert, oriented and in no distress. Voice was clear. Occasional dry cough  Face: The patient had no facial asymmetry or mass. The skin was unremarkable.  Ears: Hearing normal to conversational voice External ears were normal without deformity. Ear canals were clear. No cerumen or inflammation Tympanic membranes were intact and normal. No perforation or effusion. mobile  Nose:  The external nose had no significant deformity.   . On anterior rhinoscopy, the nasal mucosa was clear.  The anterior septum was grossly midline. There were no visualized polyps, purulence  or masses.   Oral cavity: Oral mucosa- normal although it was a little dry Oral and base of tongue- clear and without mass. Gingival and buccal mucosa-no lesions. Palate- the palate moved well. There was no cleft palate. There appeared to be good salivary flow.   Oral cavity/oropharynx- no pus, erythema or mass Culture taken  Neck:  The neck was symmetrical. The parotid and submandibular glands were normal without masses. The trachea was midline and there was no unusual crepitus. Thyroid was smooth and nontender and no masses were palpated. No masses  Lymphatics: Cervical adenopathy- none.    PROCEDURE:  FLEXIBLE LARYNGOSCOPY, NASAL ENDOSCOPY   Surgeon: Dr. Wise Indication: Reflux, sore throat, inadequate/inability to tolerate transoral exam Anesthetic: Topical lidocaine and Afrin Procedure: The patient was placed in a sitting position.  Following application of the topical anesthetic and decongestant, exam was performed with a flexible telescope.  The scope was passed along the right nasal floor to the nasopharynx.  It was then passed into the region of the middle meatus, middle turbinate, and sphenoethmoid region.  An identical procedure was performed on the left side.  The following findings were noted:  Nasal mucosa: Mild edema Septum: Deviated Right nasal cavity      Inferior turbinate: Hypertrophic      Middle turbinate: normal      Superior turbinate: normal      Inferior meatus: no pus, polyps or congestion      Middle meatus:  no pus, polyps or congestion       Superior meatus:  no pus, polyps, or congestion      Sphenoethmoidal recess: no pus, polyps or congestion  Left nasal cavity      Inferior turbinate: Hypertrophic      Middle turbinate: normal      Superior turbinate: normal      Inferior meatus: no pus, polyps or congestion      Middle meatus: no pus, polyps, or congestion      Superior meatus:  no pus, polyps, or congestion      Sphenoethmoidal recess: no pus, polyps or congestion   Nasopharynx: no masses, choanae patent, no adenoid tissue  Base of tongue and vallecula: no masses or asymmetry Posterior pharyngeal wall: no masses.  Hypopharynx: symmetrical. No masses Pyriform sinuses: no lesions or pooling of secretions Epiglottis: normal. No edema or lesions Aryepiglottic folds: normal. No lesions.  True vocal cords: clear and mobile. No lesions. Airway patent False vocal cords: normal Ventricles: no masses.  Arytenoids: Mild edema  interarytenoid area: no masses.  mild edema Subglottis: normal. no masses

## 2024-05-17 LAB — BACTERIA THROAT CULT: NORMAL

## 2024-07-08 ENCOUNTER — APPOINTMENT (OUTPATIENT)
Dept: OTOLARYNGOLOGY | Facility: CLINIC | Age: 69
End: 2024-07-08
Payer: MEDICARE

## 2024-07-08 DIAGNOSIS — J31.0 CHRONIC RHINITIS: ICD-10-CM

## 2024-07-08 DIAGNOSIS — H90.3 SENSORINEURAL HEARING LOSS, BILATERAL: ICD-10-CM

## 2024-07-08 DIAGNOSIS — H61.23 IMPACTED CERUMEN, BILATERAL: ICD-10-CM

## 2024-07-08 DIAGNOSIS — R05.9 COUGH, UNSPECIFIED: ICD-10-CM

## 2024-07-08 DIAGNOSIS — K21.9 GASTRO-ESOPHAGEAL REFLUX DISEASE W/OUT ESOPHAGITIS: ICD-10-CM

## 2024-07-08 PROCEDURE — 69210 REMOVE IMPACTED EAR WAX UNI: CPT

## 2024-07-08 PROCEDURE — 99213 OFFICE O/P EST LOW 20 MIN: CPT | Mod: 25

## 2024-07-08 RX ORDER — DESONIDE 0.5 MG/ML
0.05 LOTION TOPICAL
Qty: 59 | Refills: 0 | Status: ACTIVE | COMMUNITY
Start: 2024-05-16

## 2024-07-08 RX ORDER — GABAPENTIN 100 MG/1
100 CAPSULE ORAL
Qty: 30 | Refills: 0 | Status: COMPLETED | COMMUNITY
Start: 2024-02-12

## 2024-07-08 RX ORDER — FAMOTIDINE 20 MG/1
20 TABLET ORAL
Qty: 60 | Refills: 3 | Status: ACTIVE | COMMUNITY
Start: 2024-07-08 | End: 1900-01-01

## 2024-07-08 RX ORDER — EVOLOCUMAB 140 MG/ML
140 INJECTION, SOLUTION SUBCUTANEOUS
Qty: 6 | Refills: 0 | Status: ACTIVE | COMMUNITY
Start: 2024-06-06

## 2024-07-08 RX ORDER — ROSUVASTATIN CALCIUM 5 MG/1
5 TABLET, FILM COATED ORAL
Qty: 36 | Refills: 0 | Status: ACTIVE | COMMUNITY
Start: 2024-05-02

## 2024-07-08 RX ORDER — LEVOTHYROXINE SODIUM 0.09 MG/1
88 TABLET ORAL
Qty: 90 | Refills: 0 | Status: ACTIVE | COMMUNITY
Start: 2024-06-08

## 2024-07-08 RX ORDER — DOXYCYCLINE HYCLATE 100 MG/1
100 CAPSULE ORAL
Qty: 20 | Refills: 0 | Status: ACTIVE | COMMUNITY
Start: 2024-06-27

## 2024-07-08 RX ORDER — EMPAGLIFLOZIN 25 MG/1
25 TABLET, FILM COATED ORAL
Qty: 30 | Refills: 0 | Status: ACTIVE | COMMUNITY
Start: 2024-01-29

## 2024-07-08 RX ORDER — IBUPROFEN 600 MG/1
600 TABLET, FILM COATED ORAL
Qty: 28 | Refills: 0 | Status: ACTIVE | COMMUNITY
Start: 2024-06-04

## 2024-07-08 RX ORDER — ACYCLOVIR 800 MG/1
800 TABLET ORAL
Qty: 6 | Refills: 0 | Status: ACTIVE | COMMUNITY
Start: 2024-06-27

## 2024-07-08 RX ORDER — SEMAGLUTIDE 0.68 MG/ML
2 INJECTION, SOLUTION SUBCUTANEOUS
Qty: 3 | Refills: 0 | Status: ACTIVE | COMMUNITY
Start: 2024-07-03

## 2024-07-08 RX ORDER — CLOTRIMAZOLE 10 MG/G
1 CREAM TOPICAL
Qty: 15 | Refills: 0 | Status: ACTIVE | COMMUNITY
Start: 2024-06-27

## 2024-07-08 RX ORDER — CEPHALEXIN 500 MG/1
500 CAPSULE ORAL
Qty: 28 | Refills: 0 | Status: COMPLETED | COMMUNITY
Start: 2024-02-19

## 2024-09-24 ENCOUNTER — APPOINTMENT (OUTPATIENT)
Dept: OTOLARYNGOLOGY | Facility: CLINIC | Age: 69
End: 2024-09-24
Payer: MEDICARE

## 2024-09-24 VITALS — BODY MASS INDEX: 26.9 KG/M2 | HEIGHT: 73 IN | WEIGHT: 203 LBS

## 2024-09-24 DIAGNOSIS — K21.9 GASTRO-ESOPHAGEAL REFLUX DISEASE W/OUT ESOPHAGITIS: ICD-10-CM

## 2024-09-24 DIAGNOSIS — J31.0 CHRONIC RHINITIS: ICD-10-CM

## 2024-09-24 DIAGNOSIS — H90.3 SENSORINEURAL HEARING LOSS, BILATERAL: ICD-10-CM

## 2024-09-24 DIAGNOSIS — R05.9 COUGH, UNSPECIFIED: ICD-10-CM

## 2024-09-24 PROCEDURE — 99213 OFFICE O/P EST LOW 20 MIN: CPT

## 2024-09-24 NOTE — ASSESSMENT
[FreeTextEntry1] : He has bilateral sensorineural hearing loss.  He had scant cerumen which was removed.  Plan -Findings and management options were discussed with the patient. - Continue good ear hygiene - He did not wish to check his hearing today but will schedule an appointment for - He may consider hearing aids if he finds that he has difficulty hearing - Continue management of reflux - Follow-up after the audiogram - Return earlier if any problems

## 2024-09-24 NOTE — HISTORY OF PRESENT ILLNESS
[de-identified] : DAT HAIRSTON is a 69 year old patient With a history of bilateral sensorineural hearing loss and recurrent cerumen impaction.  He is here to have his ears checked.  He continues to have difficulty hearing in certain situations.  He has no otalgia or otorrhea.   ENT history Allergy testing was positive He has seen a pulmonologist.  He has reflux. He is not currently on medication He has bilateral sensorineural hearing

## 2024-09-24 NOTE — PHYSICAL EXAM
[TextEntry] : General: The patient was alert, oriented and in no distress. Voice was clear. Occasional dry cough- no change  Face: The patient had no facial asymmetry or mass. The skin was unremarkable.  Ears: Hearing normal to conversational voice External ears were normal without deformity. Ear canals- history of obstructing cerumen impaction  PROCEDURE- EAR MICROSCOPY   Indication: cerumen removal Under the microscope, scant cerumen was removed atraumatically from the both ears with a suction, curette and/or forceps. Canals: dry skin. no inflammation. Tympanic membranes: Intact. no perforation or effusion.  Nose: The external nose had no significant deformity. . On anterior rhinoscopy, the nasal mucosa was clear. The anterior septum was grossly midline. There were no visualized polyps, purulence or masses.  Oral cavity: Oral mucosa- normal although it was a little dry Oral and base of tongue- clear and without mass. Gingival and buccal mucosa-no lesions. Palate- the palate moved well. There was no cleft palate. There appeared to be good salivary flow. Oral cavity/oropharynx- no pus, erythema or mass   Neck: The neck was symmetrical. The parotid and submandibular glands were normal without masses. The trachea was midline and there was no unusual crepitus. Thyroid was smooth and nontender and no masses were palpated. No masses  Lymphatics: Cervical adenopathy- none.

## 2024-11-15 ENCOUNTER — APPOINTMENT (OUTPATIENT)
Dept: OTOLARYNGOLOGY | Facility: CLINIC | Age: 69
End: 2024-11-15

## 2024-11-25 ENCOUNTER — APPOINTMENT (OUTPATIENT)
Dept: OTOLARYNGOLOGY | Facility: CLINIC | Age: 69
End: 2024-11-25

## 2024-12-10 ENCOUNTER — NON-APPOINTMENT (OUTPATIENT)
Age: 69
End: 2024-12-10

## 2024-12-10 ENCOUNTER — APPOINTMENT (OUTPATIENT)
Dept: OTOLARYNGOLOGY | Facility: CLINIC | Age: 69
End: 2024-12-10
Payer: MEDICARE

## 2024-12-10 DIAGNOSIS — K21.9 GASTRO-ESOPHAGEAL REFLUX DISEASE W/OUT ESOPHAGITIS: ICD-10-CM

## 2024-12-10 DIAGNOSIS — J31.0 CHRONIC RHINITIS: ICD-10-CM

## 2024-12-10 DIAGNOSIS — H90.3 SENSORINEURAL HEARING LOSS, BILATERAL: ICD-10-CM

## 2024-12-10 PROCEDURE — 99213 OFFICE O/P EST LOW 20 MIN: CPT

## 2025-01-30 ENCOUNTER — APPOINTMENT (OUTPATIENT)
Dept: OTOLARYNGOLOGY | Facility: CLINIC | Age: 70
End: 2025-01-30
Payer: MEDICARE

## 2025-01-30 ENCOUNTER — NON-APPOINTMENT (OUTPATIENT)
Age: 70
End: 2025-01-30

## 2025-01-30 DIAGNOSIS — H61.21 IMPACTED CERUMEN, RIGHT EAR: ICD-10-CM

## 2025-01-30 DIAGNOSIS — H90.3 SENSORINEURAL HEARING LOSS, BILATERAL: ICD-10-CM

## 2025-01-30 PROCEDURE — 92557 COMPREHENSIVE HEARING TEST: CPT

## 2025-01-30 PROCEDURE — 99213 OFFICE O/P EST LOW 20 MIN: CPT

## 2025-01-30 PROCEDURE — 92567 TYMPANOMETRY: CPT

## 2025-02-20 ENCOUNTER — APPOINTMENT (OUTPATIENT)
Dept: OTOLARYNGOLOGY | Facility: CLINIC | Age: 70
End: 2025-02-20
Payer: MEDICARE

## 2025-02-20 DIAGNOSIS — K21.9 GASTRO-ESOPHAGEAL REFLUX DISEASE W/OUT ESOPHAGITIS: ICD-10-CM

## 2025-02-20 DIAGNOSIS — H69.93 UNSPECIFIED EUSTACHIAN TUBE DISORDER, BILATERAL: ICD-10-CM

## 2025-02-20 DIAGNOSIS — H90.3 SENSORINEURAL HEARING LOSS, BILATERAL: ICD-10-CM

## 2025-02-20 DIAGNOSIS — J31.0 CHRONIC RHINITIS: ICD-10-CM

## 2025-02-20 PROCEDURE — 99213 OFFICE O/P EST LOW 20 MIN: CPT

## 2025-06-06 NOTE — REASON FOR VISIT
INR results reviewed with Dr. Francis.  Orders received.  Patient notified in office of these orders.  Patient voices understanding of the information and follow-up.  AVS printed and given to patient with appointment card for next INR check.     Position: Sitting, BP Cuff Size: Large Adult)   Pulse 90   Temp 97.3 °F (36.3 °C) (Temporal)   Resp 14   Ht 1.88 m (6' 2\")   Wt 68.5 kg (151 lb)   SpO2 98%   BMI 19.39 kg/m²                                                    [Subsequent Evaluation] : a subsequent evaluation for (NAPROSYN) 500 MG tablet; Take 1 tablet by mouth 2 times daily (with meals), Disp-14 tablet, R-0Normal  -     pantoprazole (PROTONIX) 40 MG tablet; Take 1 tablet by mouth daily (with breakfast), Disp-7 tablet, R-0Normal  2. Chronic anticoagulation  -     POCT INR  -     warfarin (COUMADIN) 3 MG tablet; Take 1 tab on Sunday, Tuesday, Wednesday, Thursday and Saturday., Disp-30 tablet, R-1Normal  3. Deep vein thrombosis (DVT) of other vein of lower extremity, unspecified chronicity, unspecified laterality (HCC)  -     POCT INR  4. Pain of toe of left foot  -     methylPREDNISolone (MEDROL DOSEPACK) 4 MG tablet; Take by mouth., Disp-1 kit, R-0Normal  -     naproxen (NAPROSYN) 500 MG tablet; Take 1 tablet by mouth 2 times daily (with meals), Disp-14 tablet, R-0Normal  -     pantoprazole (PROTONIX) 40 MG tablet; Take 1 tablet by mouth daily (with breakfast), Disp-7 tablet, R-0Normal       Acute gout flare up  - Medrol pack and Naproxen ordered  - will need to check uric acid level in the next visit     Hypertension  - On HCTZ, Losartan and Amlodipine  - Considering changing HCTZ to other medication if patient having persistent high uric acid level    RTC:  Return for PCP follow up.      I have reviewed my findings and recommendations with Hector Read and Dr. Francis.    Sj Simmons MD   6/6/2025 2:56 PM       [Hearing Loss] : hearing loss

## 2025-06-17 ENCOUNTER — APPOINTMENT (OUTPATIENT)
Dept: OPHTHALMOLOGY | Facility: CLINIC | Age: 70
End: 2025-06-17
Payer: MEDICARE

## 2025-06-17 ENCOUNTER — NON-APPOINTMENT (OUTPATIENT)
Age: 70
End: 2025-06-17

## 2025-06-17 PROCEDURE — 92250 FUNDUS PHOTOGRAPHY W/I&R: CPT

## 2025-06-17 PROCEDURE — 92002 INTRM OPH EXAM NEW PATIENT: CPT

## 2025-07-08 ENCOUNTER — APPOINTMENT (OUTPATIENT)
Dept: OTOLARYNGOLOGY | Facility: CLINIC | Age: 70
End: 2025-07-08
Payer: MEDICARE

## 2025-07-08 PROCEDURE — 99213 OFFICE O/P EST LOW 20 MIN: CPT | Mod: 25

## 2025-07-08 PROCEDURE — 69210 REMOVE IMPACTED EAR WAX UNI: CPT

## 2025-07-29 ENCOUNTER — APPOINTMENT (OUTPATIENT)
Dept: SURGICAL ONCOLOGY | Facility: CLINIC | Age: 70
End: 2025-07-29

## 2025-08-05 ENCOUNTER — APPOINTMENT (OUTPATIENT)
Dept: OPHTHALMOLOGY | Facility: CLINIC | Age: 70
End: 2025-08-05

## 2025-08-20 ENCOUNTER — APPOINTMENT (OUTPATIENT)
Dept: OTOLARYNGOLOGY | Facility: CLINIC | Age: 70
End: 2025-08-20

## 2025-08-28 ENCOUNTER — APPOINTMENT (OUTPATIENT)
Dept: OTOLARYNGOLOGY | Facility: CLINIC | Age: 70
End: 2025-08-28
Payer: MEDICARE

## 2025-08-28 DIAGNOSIS — J31.0 CHRONIC RHINITIS: ICD-10-CM

## 2025-08-28 DIAGNOSIS — H90.3 SENSORINEURAL HEARING LOSS, BILATERAL: ICD-10-CM

## 2025-08-28 DIAGNOSIS — R49.0 DYSPHONIA: ICD-10-CM

## 2025-08-28 DIAGNOSIS — K21.9 GASTRO-ESOPHAGEAL REFLUX DISEASE W/OUT ESOPHAGITIS: ICD-10-CM

## 2025-08-28 DIAGNOSIS — J02.9 ACUTE PHARYNGITIS, UNSPECIFIED: ICD-10-CM

## 2025-08-28 PROCEDURE — 99214 OFFICE O/P EST MOD 30 MIN: CPT | Mod: 25

## 2025-08-28 PROCEDURE — 31575 DIAGNOSTIC LARYNGOSCOPY: CPT

## 2025-08-28 RX ORDER — FAMOTIDINE 20 MG/1
20 TABLET, FILM COATED ORAL
Qty: 60 | Refills: 3 | Status: ACTIVE | COMMUNITY
Start: 2025-08-28 | End: 1900-01-01

## 2025-09-15 ENCOUNTER — APPOINTMENT (OUTPATIENT)
Dept: OPHTHALMOLOGY | Facility: CLINIC | Age: 70
End: 2025-09-15